# Patient Record
Sex: FEMALE | Race: BLACK OR AFRICAN AMERICAN | NOT HISPANIC OR LATINO | URBAN - METROPOLITAN AREA
[De-identification: names, ages, dates, MRNs, and addresses within clinical notes are randomized per-mention and may not be internally consistent; named-entity substitution may affect disease eponyms.]

---

## 2017-08-07 ENCOUNTER — OFFICE (OUTPATIENT)
Dept: URBAN - METROPOLITAN AREA CLINIC 101 | Facility: CLINIC | Age: 67
End: 2017-08-07
Payer: COMMERCIAL

## 2017-08-07 VITALS
DIASTOLIC BLOOD PRESSURE: 66 MMHG | WEIGHT: 182 LBS | TEMPERATURE: 97.7 F | SYSTOLIC BLOOD PRESSURE: 125 MMHG | HEIGHT: 69 IN | HEART RATE: 96 BPM

## 2017-08-07 DIAGNOSIS — R05 COUGH: ICD-10-CM

## 2017-08-07 DIAGNOSIS — F17.200 NICOTINE DEPENDENCE, UNSPECIFIED, UNCOMPLICATED: ICD-10-CM

## 2017-08-07 DIAGNOSIS — I49.9 CARDIAC ARRHYTHMIA, UNSPECIFIED: ICD-10-CM

## 2017-08-07 DIAGNOSIS — R13.10 DYSPHAGIA, UNSPECIFIED: ICD-10-CM

## 2017-08-07 PROCEDURE — 99204 OFFICE O/P NEW MOD 45 MIN: CPT

## 2017-08-07 RX ORDER — LANSOPRAZOLE 30 MG/1
30 CAPSULE, DELAYED RELEASE PELLETS ORAL
Qty: 90 | Refills: 2 | Status: COMPLETED
Start: 2017-08-07 | End: 2021-03-10

## 2017-08-07 NOTE — SERVICEHPINOTES
Zakia Parkinson is a 65 YO female who is seen in consultation at request of Hetaher Lundberg NP  for dysphagia. She has had difficulty swallowing chicken and bread for years. No problems with liquids. She awoke in the middle of the night and she choked and coughed a while . Her  thought she could not breath. She also has short bursts of coughing. She denies heartburn, nausea, vomiting, family h/o GI cancer, weight loss. Her PCP's note mentioned that her barium swallow esophageal dysmotility and we have not received a copy of the study.  She has smoked for 40 years, and has decreased to four cigarettes a day. She had a colonoscopy at [place] a few years ago and we will obtain records. She sees a cardiologist, name unknown, for some sort of cardiac arrhythmia. No chest pain or SOB.

## 2017-09-27 ENCOUNTER — ON CAMPUS - OUTPATIENT (OUTPATIENT)
Dept: URBAN - METROPOLITAN AREA HOSPITAL 35 | Facility: HOSPITAL | Age: 67
End: 2017-09-27
Payer: COMMERCIAL

## 2017-09-27 DIAGNOSIS — R13.10 DYSPHAGIA, UNSPECIFIED: ICD-10-CM

## 2017-09-27 DIAGNOSIS — K25.6 CHRONIC OR UNSPECIFIED GASTRIC ULCER WITH BOTH HEMORRHAGE AN: ICD-10-CM

## 2017-09-27 DIAGNOSIS — K21.9 GASTRO-ESOPHAGEAL REFLUX DISEASE WITHOUT ESOPHAGITIS: ICD-10-CM

## 2017-09-27 DIAGNOSIS — K29.60 OTHER GASTRITIS WITHOUT BLEEDING: ICD-10-CM

## 2017-09-27 PROCEDURE — 43239 EGD BIOPSY SINGLE/MULTIPLE: CPT

## 2017-12-21 ENCOUNTER — OFFICE (OUTPATIENT)
Dept: URBAN - METROPOLITAN AREA CLINIC 101 | Facility: CLINIC | Age: 67
End: 2017-12-21
Payer: COMMERCIAL

## 2017-12-21 ENCOUNTER — OFFICE (OUTPATIENT)
Dept: URBAN - METROPOLITAN AREA CLINIC 101 | Facility: CLINIC | Age: 67
End: 2017-12-21

## 2017-12-21 VITALS
HEART RATE: 78 BPM | TEMPERATURE: 97.9 F | WEIGHT: 197 LBS | DIASTOLIC BLOOD PRESSURE: 80 MMHG | SYSTOLIC BLOOD PRESSURE: 141 MMHG | HEIGHT: 69 IN

## 2017-12-21 DIAGNOSIS — K59.09 OTHER CONSTIPATION: ICD-10-CM

## 2017-12-21 DIAGNOSIS — I49.9 CARDIAC ARRHYTHMIA, UNSPECIFIED: ICD-10-CM

## 2017-12-21 DIAGNOSIS — F17.200 NICOTINE DEPENDENCE, UNSPECIFIED, UNCOMPLICATED: ICD-10-CM

## 2017-12-21 DIAGNOSIS — D64.9 ANEMIA, UNSPECIFIED: ICD-10-CM

## 2017-12-21 PROCEDURE — 00031: CPT

## 2017-12-21 PROCEDURE — 99214 OFFICE O/P EST MOD 30 MIN: CPT

## 2017-12-21 NOTE — SERVICEHPINOTES
ROSARIO MITTAL   is a   67  female who presents for anemia. She was at [facility] on 12/13/17 for anemia. Hgb 7.2. Ferritin 4.4, % sat 5%. She had 2 transfusions and an IV iron transfusion. She states she had repeat labs done on Monday (12/18/17). She does have a history of gastric ulcers and upper GI bleed, last in 2009. Last EGD for dysphagia on 9/27/17 revealed mild gastritis and esophagitis. She then had a modified barium swallow with speech on 11/7. It was recommended that she take small bites and always drink water when she is eating. She states that since doing this, her dysphagia is much better. She denies any GI complaints, aside from constipation. She has a long history of constipation, increased by her pain medication. She reports recently starting an "orange pill" for opioid-induced constipation (Movantik?). She has tried Miralax and stool softeners in the past with no relief. She denies heartburn, n/v, melena, blood in the stool, abdominal pain, weight loss. Her last colonoscopy was in 2009, normal. She denies family history of colon cancer or polyps.BRShe does report an incident at the beginning of October where she became diaphoretic and had explosive diarrhea (around 7pm). She states that her  thought it looked like blood was mixed in and she agreed. She had a few episodes of diarrhea and then the next day around noon she felt better and had no further issues.BR

## 2018-01-09 ENCOUNTER — ON CAMPUS - OUTPATIENT (OUTPATIENT)
Dept: URBAN - METROPOLITAN AREA HOSPITAL 35 | Facility: HOSPITAL | Age: 68
End: 2018-01-09

## 2018-01-09 DIAGNOSIS — K63.5 POLYP OF COLON: ICD-10-CM

## 2018-01-09 DIAGNOSIS — K62.1 RECTAL POLYP: ICD-10-CM

## 2018-01-09 DIAGNOSIS — D50.9 IRON DEFICIENCY ANEMIA, UNSPECIFIED: ICD-10-CM

## 2018-01-09 PROCEDURE — 45380 COLONOSCOPY AND BIOPSY: CPT

## 2021-03-10 ENCOUNTER — OFFICE (OUTPATIENT)
Dept: URBAN - METROPOLITAN AREA CLINIC 102 | Facility: CLINIC | Age: 71
End: 2021-03-10
Payer: COMMERCIAL

## 2021-03-10 ENCOUNTER — OFFICE (OUTPATIENT)
Dept: URBAN - METROPOLITAN AREA CLINIC 102 | Facility: CLINIC | Age: 71
End: 2021-03-10

## 2021-03-10 VITALS
DIASTOLIC BLOOD PRESSURE: 80 MMHG | SYSTOLIC BLOOD PRESSURE: 134 MMHG | HEIGHT: 69 IN | TEMPERATURE: 97.9 F | WEIGHT: 165 LBS | HEART RATE: 89 BPM

## 2021-03-10 DIAGNOSIS — Z87.11 PERSONAL HISTORY OF PEPTIC ULCER DISEASE: ICD-10-CM

## 2021-03-10 DIAGNOSIS — K21.00 GASTRO-ESOPHAGEAL REFLUX DISEASE WITH ESOPHAGITIS, WITHOUT B: ICD-10-CM

## 2021-03-10 DIAGNOSIS — R10.10 UPPER ABDOMINAL PAIN, UNSPECIFIED: ICD-10-CM

## 2021-03-10 DIAGNOSIS — T40.2X5A ADVERSE EFFECT OF OTHER OPIOIDS, INITIAL ENCOUNTER: ICD-10-CM

## 2021-03-10 DIAGNOSIS — K59.09 OTHER CONSTIPATION: ICD-10-CM

## 2021-03-10 DIAGNOSIS — K62.5 HEMORRHAGE OF ANUS AND RECTUM: ICD-10-CM

## 2021-03-10 PROCEDURE — 00031: CPT | Performed by: INTERNAL MEDICINE

## 2021-03-10 PROCEDURE — 99204 OFFICE O/P NEW MOD 45 MIN: CPT

## 2021-03-10 RX ORDER — LUBIPROSTONE 24 UG/1
CAPSULE, GELATIN COATED ORAL
Qty: 60 | Refills: 5 | Status: COMPLETED
Start: 2021-03-10 | End: 2022-01-12

## 2021-03-10 NOTE — SERVICEHPINOTES
ROSARIO MITTAL   is a   70   year old    female who is being seen in consultation at the request of   REGINE POLK   for abd pain. Pt presents for multiple issues. Pt does not stay on topic well so history and questions are a bit difficult. She has a pain pump (morphine). She reports upper abdominal burning pain. Is taking pantoprazole and sucralfate but unsure if these are helping. She does continue to have dysphagia at times, mainly to peanut butter has not worsened. Nausea as well but does not vomit. Last EGD for dysphagia on 9/27/17 revealed mild gastritis and esophagitis. She then had a modified barium swallow with speech on 11/7. It was recommended that she take small bites and always drink water when she is eating. BRShe reports chronic constipation as well. Ongoing for years but worsened after getting pain pump. States she only has a BM if she takes some sort of laxative.  BSS type 1-3. States she took a laxative (senna?) which worked for awhile then stopped. Per PCP note she rx'd linzess but was not able to try as insurance did not cover has not tried any other rx meds. Stool softeners/miralax does not help. Will have brbpr when wiping as well, this has happened twice. She smokes both tobacco and marijuana. BRColonoscopy 1/2018 with hyperplastic polyps, small internal hemorrhoids. Repeat in 10 years. She states her pain management doctor is recommending she gets an updated EGD and colonoscopy. No known cardiac issues.

## 2021-03-11 ENCOUNTER — OFFICE (OUTPATIENT)
Dept: URBAN - METROPOLITAN AREA CLINIC 34 | Facility: CLINIC | Age: 71
End: 2021-03-11
Payer: MEDICARE

## 2021-03-11 DIAGNOSIS — Z11.59 ENCOUNTER FOR SCREENING FOR OTHER VIRAL DISEASES: ICD-10-CM

## 2021-03-11 PROCEDURE — 99211 OFF/OP EST MAY X REQ PHY/QHP: CPT | Mod: 25,CS | Performed by: INTERNAL MEDICINE

## 2021-03-16 ENCOUNTER — ON CAMPUS - OUTPATIENT (OUTPATIENT)
Dept: URBAN - METROPOLITAN AREA HOSPITAL 37 | Facility: HOSPITAL | Age: 71
End: 2021-03-16
Payer: COMMERCIAL

## 2021-03-16 DIAGNOSIS — K62.5 HEMORRHAGE OF ANUS AND RECTUM: ICD-10-CM

## 2021-03-16 DIAGNOSIS — K21.9 GASTRO-ESOPHAGEAL REFLUX DISEASE WITHOUT ESOPHAGITIS: ICD-10-CM

## 2021-03-16 DIAGNOSIS — K63.5 POLYP OF COLON: ICD-10-CM

## 2021-03-16 DIAGNOSIS — R10.10 UPPER ABDOMINAL PAIN, UNSPECIFIED: ICD-10-CM

## 2021-03-16 DIAGNOSIS — K59.09 OTHER CONSTIPATION: ICD-10-CM

## 2021-03-16 DIAGNOSIS — K31.89 OTHER DISEASES OF STOMACH AND DUODENUM: ICD-10-CM

## 2021-03-16 PROCEDURE — 45380 COLONOSCOPY AND BIOPSY: CPT | Performed by: INTERNAL MEDICINE

## 2021-03-16 PROCEDURE — 43239 EGD BIOPSY SINGLE/MULTIPLE: CPT | Performed by: INTERNAL MEDICINE

## 2022-01-12 ENCOUNTER — OFFICE (OUTPATIENT)
Dept: URBAN - METROPOLITAN AREA CLINIC 102 | Facility: CLINIC | Age: 72
End: 2022-01-12

## 2022-01-12 VITALS
DIASTOLIC BLOOD PRESSURE: 70 MMHG | WEIGHT: 172 LBS | HEIGHT: 69 IN | HEART RATE: 70 BPM | SYSTOLIC BLOOD PRESSURE: 118 MMHG | TEMPERATURE: 98.2 F

## 2022-01-12 DIAGNOSIS — K59.09 OTHER CONSTIPATION: ICD-10-CM

## 2022-01-12 DIAGNOSIS — R63.4 ABNORMAL WEIGHT LOSS: ICD-10-CM

## 2022-01-12 PROCEDURE — 99214 OFFICE O/P EST MOD 30 MIN: CPT

## 2022-01-12 NOTE — SERVICEHPINOTES
ROSARIO MITTAL   is a   71  female who presents for weight loss. 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 Presents for weight loss as she was having issues with appetite. However, was started on remeron through PCP a couple months ago and has since gained weight back. Per our records she is 8lbs heavier than she was last March. EGD and colonoscopy 3/2021 with benign findings. Reports a normal CT scan done recently as well. No abd pain issues. She is no longer on morphine pain pump, just tylenol 3. Still deals w/ chronic constipation which has been a problem her whole life. Pt states she was given a medication by pain management for constipation although does not really help. (movantik?). Also tried amitiza previously. She is fine with current regimen, taking stool softeners daily and various laxatives prn.

## 2024-04-24 ENCOUNTER — OFFICE (OUTPATIENT)
Dept: URBAN - METROPOLITAN AREA CLINIC 102 | Facility: CLINIC | Age: 74
End: 2024-04-24

## 2024-04-24 VITALS
TEMPERATURE: 97.1 F | DIASTOLIC BLOOD PRESSURE: 73 MMHG | HEIGHT: 69 IN | WEIGHT: 162 LBS | HEART RATE: 84 BPM | SYSTOLIC BLOOD PRESSURE: 128 MMHG

## 2024-04-24 DIAGNOSIS — F11.90 OPIOID USE, UNSPECIFIED, UNCOMPLICATED: ICD-10-CM

## 2024-04-24 DIAGNOSIS — K59.09 OTHER CONSTIPATION: ICD-10-CM

## 2024-04-24 DIAGNOSIS — K62.5 HEMORRHAGE OF ANUS AND RECTUM: ICD-10-CM

## 2024-04-24 PROCEDURE — 00031: CPT | Performed by: INTERNAL MEDICINE

## 2024-04-24 PROCEDURE — 99214 OFFICE O/P EST MOD 30 MIN: CPT | Performed by: NURSE PRACTITIONER

## 2024-04-24 RX ORDER — LUBIPROSTONE 24 UG/1
48 CAPSULE, GELATIN COATED ORAL
Qty: 60 | Refills: 4 | Status: ACTIVE
Start: 2024-04-24

## 2024-04-24 NOTE — SERVICEHPINOTES
ROSARIO MITTAL     female who complains of intermittent  rectal bleeding x 1 year. Would occasional see when she wiped. Went to ER 3/16/21 for rectal bleeding  "large clots when she wiped."  Now seeing blood in toilet bowel and when wiping, yesterday not sure if in stool.   There is rectal pain associated with passage of bowel movements. There is straining with defecation. Stools are described as hard. There is not abdominal pain in association with symptoms. There is not rectal discharge.  + tenesmus. There isn''t any associated nausea, vomiting, fevers, chills, night sweats. There is not a family history of colon cancer. br 
+ weight loss 10-15 pounds  within a year br
br
brConstipation- does MiraLAX 2x a day and then takes a laxative, not sure of name and if she does not do this, she will not go. Takes tylenol # 3 ( on it for 3 years) for back pain. PCP gave linzess  2-3 months ago and "worked too much" developed  diarrhea. not taking anymore "too much, no control and was having accidents."  Only took for a week and then stopped and then decided to do miralx and laxative br
br  Last colonoscopy 2021- nonspecific reactive changes, negative for dysplasia, malignancy, 
br
br Sees cardiology Dr. Jin  "not sure what is wrong with her heart but she will need a pacemaker eventually." 
br
br
br No pulmonary issues, not on blood thinner
br br 
br